# Patient Record
Sex: MALE | Race: WHITE
[De-identification: names, ages, dates, MRNs, and addresses within clinical notes are randomized per-mention and may not be internally consistent; named-entity substitution may affect disease eponyms.]

---

## 2020-03-07 ENCOUNTER — HOSPITAL ENCOUNTER (OUTPATIENT)
Dept: HOSPITAL 53 - M LRY | Age: 55
End: 2020-03-07
Attending: PHYSICIAN ASSISTANT
Payer: SELF-PAY

## 2020-03-07 DIAGNOSIS — R06.02: Primary | ICD-10-CM

## 2020-03-07 DIAGNOSIS — R07.9: ICD-10-CM

## 2020-04-20 ENCOUNTER — HOSPITAL ENCOUNTER (OUTPATIENT)
Dept: HOSPITAL 53 - M RAD | Age: 55
End: 2020-04-20
Attending: INTERNAL MEDICINE
Payer: COMMERCIAL

## 2020-04-20 DIAGNOSIS — R06.02: Primary | ICD-10-CM

## 2020-04-21 NOTE — REP
Clinical:  Shortness of breath.  History of testicular cancer and prior

orchiectomy.

 

Technique:  Axial noncontrast images from the thoracic inlet to the upper abdomen

with coronal and sagittal re-formations.

 

Findings:

Lung fields demonstrate changes related to early mild emphysematous disease with

chronic interstitial changes and mild primarily lower lobe bronchiectasis.  No

focal acute consolidation, significant nodule or mass lesion.  No pleural

effusion.  No pneumothorax.  No significant adenopathy.  Mediastinum demonstrates

mild atherosclerotic changes of the thoracic aorta and coronary arteries without

aortic aneurysm or cardiomegaly.  Sipvby-M-[id number] identified with tip at the

brachiocephalic/SVC confluence.  Surrounding musculoskeletal structures without

focal osseous abnormality.

 

Limited upper abdomen demonstrates normal bilateral adrenal glands and evidence

for cholelithiasis.

 

Impression:

Chronic changes most compatible with mild/early emphysematous disease.

 

 

Electronically Signed by

Jaron Peterson MD 04/21/2020 08:01 A

## 2020-04-28 ENCOUNTER — HOSPITAL ENCOUNTER (OUTPATIENT)
Dept: HOSPITAL 53 - M CARPUL | Age: 55
End: 2020-04-28
Attending: INTERNAL MEDICINE
Payer: COMMERCIAL

## 2020-04-28 DIAGNOSIS — R06.02: Primary | ICD-10-CM

## 2020-04-28 PROCEDURE — 94070 EVALUATION OF WHEEZING: CPT

## 2020-04-28 NOTE — PFTRPT
Height: 66.00  Inches  Weight: 210.00  Lbs  BSA: 2.04

Diagnosis: R06.02



DATE OF PROCEDURE:  04/28/2020



ORDERED BY:  Dr. Gonzalez 



INTERPRETATION:  Study of excellent technical quality.  Under protocol, 
methacholine was administered.  Even after a maximal dose of 25 mg or 188.875 
CDUs, no provocation dose ever achieved.



IMPRESSION:  Negative methacholine challenge study.

MTDD

## 2020-09-25 ENCOUNTER — HOSPITAL ENCOUNTER (OUTPATIENT)
Dept: HOSPITAL 53 - M IRPRO | Age: 55
End: 2020-09-25
Attending: RADIOLOGY
Payer: COMMERCIAL

## 2020-09-25 VITALS — DIASTOLIC BLOOD PRESSURE: 54 MMHG | SYSTOLIC BLOOD PRESSURE: 101 MMHG

## 2020-09-25 DIAGNOSIS — Z45.2: Primary | ICD-10-CM

## 2020-09-25 DIAGNOSIS — Z85.47: ICD-10-CM

## 2020-09-25 PROCEDURE — 36590 REMOVAL TUNNELED CV CATH: CPT

## 2020-09-25 PROCEDURE — 99153 MOD SED SAME PHYS/QHP EA: CPT

## 2020-09-25 PROCEDURE — 99152 MOD SED SAME PHYS/QHP 5/>YRS: CPT

## 2020-09-25 NOTE — POST-OPPD
Postoperative Procedure Note


Date Of Procedure:  Sep 25, 2020


Time Of Procedure:  09:18


Port Removal / Explant





Clinical Information:Patient with history of testicular cancer. Port placed 15 

years ago. Port has not been accessed or flushed in last 10 years. Patient 

referred for port removal by nephrology for venous preservation.





Physician: Dr. Davis





Procedure: The patient was advised of the benefits, risks, and alternatives of 

the procedure and informed consent was obtained.





A time out was performed with verification of the patient's name, MRN, site of 

procedure, and type of procedure to be performed. The patient was positioned in

the supine position on the angiographic table. The site was prepped and draped 

in the usual sterile fashion.





Moderate sedation was performed by the physician including the presence of an 

independent trained observer who assisted in monitoring the patient's level of 

consciousness and physiological status. Following the administration of 

fentanyl and Versed, the physician spent time 60 minutes of continuous 

face-to-face time with the patient.





A  radiograph reveals a left-sided IJ port. Tip projecting over the 

brachiocephalic vein.





Ultrasound of the left neck demonstrates catheter entering the vein at the 

internal jugular/subclavian junction.





The soft tissues overlying the port were anesthetized with lidocaine. An 

incision was made over the port using a 15 blade scalpel in the location of the 

prior incision. The first 4 cm of the catheter was then freed with blunt 

dissection and extracted. However, beyond this, further catheter could not be 

retracted. Additional blunt dissection was performed along the length of the 

catheter with gentle traction but the catheter could not be freed. Any further 

traction at this point may cause catheter fracture. Therefore. the catheter was 

looped back into the incision. A single deep Vicryl suture was used to appose 

the sides of the incision. Sterile dressing was applied to the site.





The patient tolerated the procedure well and was returned to the PRU in stable 

condition.





EBL:Less than 5 mL





Complications:None.





Conclusions:





Unsuccessful removal of left-sided chest port. The catheter is tethered in the 

tunnel and at the venotomy and will require cut down. I discussed the case with 

Dr. Hart who will see the patient in his outpatient office and proceed from 

there. Patient to follow-up in IR clinic in 2 weeks for incision check. His 

incision is essentially left to heal by secondary intention as the port has 

already been exposed to the environment.





CC IAN Kathleen MD               Sep 25, 2020 09:27

## 2020-10-13 ENCOUNTER — HOSPITAL ENCOUNTER (OUTPATIENT)
Dept: HOSPITAL 53 - M TMIRPOV | Age: 55
End: 2020-10-13
Attending: RADIOLOGY
Payer: COMMERCIAL

## 2020-10-13 DIAGNOSIS — Z45.2: Primary | ICD-10-CM

## 2020-10-14 NOTE — IRPN
[facility] IR Progress Note


IR Progress Note


DATE:  Oct 13, 2020


FOLLOW-UP: Status post failed port removal. Patient states the incision site is 

healed. No fevers or chills. Patient is due to follow up with general surgery 

for further evaluation. No further follow-up scheduled.





Thank you for this referral





Allergies


Coded Allergies:  


     No Known Drug Allergies (Verified  Allergy, Unknown, 4/24/20)











IAN SANCHEZ MD               Oct 14, 2020 13:01